# Patient Record
Sex: FEMALE | URBAN - METROPOLITAN AREA
[De-identification: names, ages, dates, MRNs, and addresses within clinical notes are randomized per-mention and may not be internally consistent; named-entity substitution may affect disease eponyms.]

---

## 2022-01-18 ENCOUNTER — APPOINTMENT (RX ONLY)
Dept: URBAN - METROPOLITAN AREA CLINIC 11 | Facility: CLINIC | Age: 52
Setting detail: DERMATOLOGY
End: 2022-01-18

## 2022-01-18 DIAGNOSIS — Z41.1 ENCOUNTER FOR COSMETIC SURGERY: ICD-10-CM

## 2022-01-18 PROCEDURE — ? CONSULTATION - ABDOMINOPLASTY

## 2022-01-18 PROCEDURE — 99003: CPT

## 2022-01-18 PROCEDURE — ? CONSULTATION - BREAST (COSMETIC)

## 2022-01-18 PROCEDURE — ? CONSULTATION - BODY CONTOURING

## 2022-01-18 ASSESSMENT — LOCATION SIMPLE DESCRIPTION DERM
LOCATION SIMPLE: ABDOMEN
LOCATION SIMPLE: RIGHT LOWER BACK
LOCATION SIMPLE: LEFT LOWER BACK
LOCATION SIMPLE: RIGHT BUTTOCK
LOCATION SIMPLE: LEFT BUTTOCK

## 2022-01-18 ASSESSMENT — LOCATION DETAILED DESCRIPTION DERM
LOCATION DETAILED: RIGHT INFERIOR LATERAL LOWER BACK
LOCATION DETAILED: RIGHT BUTTOCK
LOCATION DETAILED: LEFT INFERIOR LATERAL LOWER BACK
LOCATION DETAILED: PERIUMBILICAL SKIN
LOCATION DETAILED: LEFT BUTTOCK

## 2022-01-18 ASSESSMENT — LOCATION ZONE DERM: LOCATION ZONE: TRUNK

## 2022-01-18 NOTE — PROCEDURE: CONSULTATION - BREAST (COSMETIC)
Sientra Gel Implant Size(S) - Left: 350
Send Procedure Quote As Charge: No
Sientra Gel Implant Size(S) - Right: 350
Detail Level: Simple

## 2022-01-18 NOTE — HPI: ABDOMEN (AESTHETIC DEFORMITY, ABDOMEN)
Is This A New Presentation, Or A Follow-Up?: Abdominal Aesthetic Deformity
How Much Weight (In Lbs.) Have You Lost?: 97
Over What Period Of Time?: 2 years

## 2022-03-17 ENCOUNTER — APPOINTMENT (RX ONLY)
Dept: URBAN - METROPOLITAN AREA CLINIC 11 | Facility: CLINIC | Age: 52
Setting detail: DERMATOLOGY
End: 2022-03-17

## 2022-03-17 DIAGNOSIS — Z41.9 ENCOUNTER FOR PROCEDURE FOR PURPOSES OTHER THAN REMEDYING HEALTH STATE, UNSPECIFIED: ICD-10-CM

## 2022-03-17 PROCEDURE — Z1101: HCPCS

## 2022-03-17 PROCEDURE — 99004: CPT

## 2022-03-17 PROCEDURE — ? PRESCRIPTION

## 2022-03-17 PROCEDURE — ? PRE-OP WORKLIST

## 2022-03-17 RX ORDER — GABAPENTIN 300 MG/1
CAPSULE ORAL TID
Qty: 16 | Refills: 0 | Status: ERX | COMMUNITY
Start: 2022-03-17

## 2022-03-17 RX ORDER — ONDANSETRON 4 MG/1
TABLET, ORALLY DISINTEGRATING ORAL PRN
Qty: 15 | Refills: 0 | Status: ERX | COMMUNITY
Start: 2022-03-17

## 2022-03-17 RX ORDER — DIAZEPAM 5 MG/1
TABLET ORAL
Qty: 15 | Refills: 0 | Status: ERX | COMMUNITY
Start: 2022-03-17

## 2022-03-17 RX ORDER — OXYCODONE HYDROCHLORIDE 5 MG/1
TABLET ORAL PRN
Qty: 20 | Refills: 0 | Status: ERX | COMMUNITY
Start: 2022-03-17

## 2022-03-17 RX ORDER — MELOXICAM 7.5 MG/1
TABLET ORAL BID
Qty: 15 | Refills: 0 | Status: ERX | COMMUNITY
Start: 2022-03-17

## 2022-03-17 RX ORDER — CEPHALEXIN 500 MG/1
TABLET ORAL QID
Qty: 28 | Refills: 0 | Status: ERX | COMMUNITY
Start: 2022-03-17

## 2022-03-17 RX ADMIN — ONDANSETRON: 4 TABLET, ORALLY DISINTEGRATING ORAL at 00:00

## 2022-03-17 RX ADMIN — MELOXICAM: 7.5 TABLET ORAL at 00:00

## 2022-03-17 RX ADMIN — CEPHALEXIN: 500 TABLET ORAL at 00:00

## 2022-03-17 RX ADMIN — GABAPENTIN: 300 CAPSULE ORAL at 00:00

## 2022-03-17 RX ADMIN — DIAZEPAM: 5 TABLET ORAL at 00:00

## 2022-03-17 RX ADMIN — OXYCODONE HYDROCHLORIDE: 5 TABLET ORAL at 00:00

## 2022-03-17 ASSESSMENT — LOCATION DETAILED DESCRIPTION DERM
LOCATION DETAILED: LEFT INFERIOR LATERAL LOWER BACK
LOCATION DETAILED: RIGHT MEDIAL BREAST 5-6:00 REGION
LOCATION DETAILED: RIGHT AXILLARY TAIL OF BREAST
LOCATION DETAILED: LEFT AXILLARY TAIL OF BREAST
LOCATION DETAILED: PERIUMBILICAL SKIN
LOCATION DETAILED: LEFT BUTTOCK
LOCATION DETAILED: LEFT MEDIAL BREAST 6-7:00 REGION
LOCATION DETAILED: RIGHT BUTTOCK
LOCATION DETAILED: RIGHT INFERIOR LATERAL LOWER BACK

## 2022-03-17 ASSESSMENT — LOCATION SIMPLE DESCRIPTION DERM
LOCATION SIMPLE: RIGHT LOWER BACK
LOCATION SIMPLE: LEFT BUTTOCK
LOCATION SIMPLE: LEFT BREAST
LOCATION SIMPLE: LEFT LOWER BACK
LOCATION SIMPLE: ABDOMEN
LOCATION SIMPLE: RIGHT BUTTOCK
LOCATION SIMPLE: RIGHT BREAST

## 2022-03-17 ASSESSMENT — LOCATION ZONE DERM: LOCATION ZONE: TRUNK

## 2022-03-17 NOTE — HPI: PREOPERATIVE APPOINTMENT (BRIEF)
Has The Patient Completed Informed Consent?: is scheduled to complete informed consent documentation during this visit
Has The Patient Fulfilled Financial Responsibilities For Surgical Scheduling?: is scheduled to complete payment to fulfill financial responsibilities during this visit
Have Arrangements And Instructions Been Made For Patient Transportation?: Transportation arrangements have been made
Date Of Procedure?: 03/31/2022
Facility: [facility]
Surgeon: Dr. Del Rosario

## 2022-03-17 NOTE — PROCEDURE: PRE-OP WORKLIST
Surgery Scheduled: circumferential abdominoplasty with liposuction, bilateral mastopexy augmentation, axillary liposuction, fat grafting to buttocks
Date Of Surgery: 03/31/2022
Photos Taken?: yes
Admission Status: outpatient
Surgeon: Dr. Del Rosario
Detail Level: Simple

## 2022-03-31 ENCOUNTER — APPOINTMENT (RX ONLY)
Dept: URBAN - METROPOLITAN AREA SURGERY CENTER 4 | Facility: SURGERY CENTER | Age: 52
Setting detail: DERMATOLOGY
End: 2022-03-31

## 2022-03-31 DIAGNOSIS — Z41.1 ENCOUNTER FOR COSMETIC SURGERY: ICD-10-CM

## 2022-03-31 PROCEDURE — 15847 EXC SKIN ABD ADD-ON: CPT

## 2022-03-31 PROCEDURE — 19316 MASTOPEXY: CPT | Mod: 50

## 2022-03-31 PROCEDURE — ? SET GLOBAL PERIOD

## 2022-03-31 PROCEDURE — 19325 BREAST AUGMENTATION W/IMPLT: CPT | Mod: 50

## 2022-03-31 ASSESSMENT — LOCATION ZONE DERM: LOCATION ZONE: TRUNK

## 2022-03-31 ASSESSMENT — LOCATION DETAILED DESCRIPTION DERM: LOCATION DETAILED: PERIUMBILICAL SKIN

## 2022-03-31 ASSESSMENT — LOCATION SIMPLE DESCRIPTION DERM: LOCATION SIMPLE: ABDOMEN

## 2022-03-31 NOTE — PROCEDURE: SET GLOBAL PERIOD
Other Surgery Performed: circumferential abdominoplasty and mastopexy with implant and fat grafting to buttocks
Detail Level: Detailed
Surgery Global Period: 0
Date Of Surgery (Mm/Dd/Yyyy): 03/31/2022

## 2022-04-01 ENCOUNTER — APPOINTMENT (RX ONLY)
Dept: URBAN - METROPOLITAN AREA CLINIC 11 | Facility: CLINIC | Age: 52
Setting detail: DERMATOLOGY
End: 2022-04-01

## 2022-04-01 DIAGNOSIS — Z48.89 ENCOUNTER FOR OTHER SPECIFIED SURGICAL AFTERCARE: ICD-10-CM

## 2022-04-01 PROCEDURE — ? COUNSELING - POST-OP CHECK, ABDOMINOPLASTY

## 2022-04-01 PROCEDURE — ? COUNSELING - POST-OP CHECK, AUGMENTATION MASTOPEXY

## 2022-04-01 PROCEDURE — ? COUNSELING - POST-OP CHECK, LIPOSUCTION

## 2022-04-01 PROCEDURE — ? CODE 99024 - NO CHARGE POST-OP VISIT

## 2022-04-01 PROCEDURE — ? COUNSELING - POST-OP CHECK

## 2022-04-01 PROCEDURE — 99024 POSTOP FOLLOW-UP VISIT: CPT

## 2022-04-01 ASSESSMENT — LOCATION DETAILED DESCRIPTION DERM
LOCATION DETAILED: RIGHT LATERAL ABDOMEN
LOCATION DETAILED: LEFT ANTERIOR AXILLA
LOCATION DETAILED: LEFT MEDIAL BREAST 8-9:00 REGION
LOCATION DETAILED: RIGHT BUTTOCK
LOCATION DETAILED: LEFT LATERAL ABDOMEN
LOCATION DETAILED: RIGHT ANTERIOR AXILLA
LOCATION DETAILED: LEFT BUTTOCK
LOCATION DETAILED: PERIUMBILICAL SKIN
LOCATION DETAILED: RIGHT MEDIAL BREAST 5-6:00 REGION

## 2022-04-01 ASSESSMENT — LOCATION SIMPLE DESCRIPTION DERM
LOCATION SIMPLE: LEFT BUTTOCK
LOCATION SIMPLE: LEFT ANTERIOR AXILLA
LOCATION SIMPLE: LEFT BREAST
LOCATION SIMPLE: RIGHT ANTERIOR AXILLA
LOCATION SIMPLE: RIGHT BUTTOCK
LOCATION SIMPLE: RIGHT BREAST
LOCATION SIMPLE: ABDOMEN

## 2022-04-01 ASSESSMENT — LOCATION ZONE DERM
LOCATION ZONE: TRUNK
LOCATION ZONE: AXILLAE
LOCATION ZONE: TRUNK

## 2022-04-01 NOTE — PROCEDURE: COUNSELING - POST-OP CHECK, ABDOMINOPLASTY
Detail Level: Simple
Add Postop Global No-Charge Code (66880)?: yes
Count Minor/Major Decisions Toward Mdm (Not Cosmetic)?: No

## 2022-04-01 NOTE — PROCEDURE: COUNSELING - POST-OP CHECK, AUGMENTATION MASTOPEXY
Add Postop Global No-Charge Code ([id number])?: yes
Detail Level: Zone
Count Minor/Major Decisions Toward Mdm (Not Cosmetic)?: No

## 2022-04-01 NOTE — PROCEDURE: COUNSELING - POST-OP CHECK, LIPOSUCTION
Detail Level: Simple
Add Postop Global No-Charge Code ([id number])?: yes
Count Minor/Major Decisions Toward Mdm (Not Cosmetic)?: No

## 2022-04-01 NOTE — PROCEDURE: REASSURANCE
Follow-Up Interval: week
Information Provided: The examination is within normal limits today.
Follow-Up Increment: 1
Detail Level: Simple
Follow-Up: yes

## 2022-04-07 ENCOUNTER — APPOINTMENT (RX ONLY)
Dept: URBAN - METROPOLITAN AREA CLINIC 11 | Facility: CLINIC | Age: 52
Setting detail: DERMATOLOGY
End: 2022-04-07

## 2022-04-07 DIAGNOSIS — Z48.89 ENCOUNTER FOR OTHER SPECIFIED SURGICAL AFTERCARE: ICD-10-CM

## 2022-04-07 PROCEDURE — 99024 POSTOP FOLLOW-UP VISIT: CPT

## 2022-04-07 PROCEDURE — ? COUNSELING - POST-OP CHECK, AUGMENTATION MASTOPEXY

## 2022-04-07 PROCEDURE — ? COUNSELING - POST-OP CHECK, LIPOSUCTION

## 2022-04-07 PROCEDURE — ? COUNSELING - POST-OP CHECK, ABDOMINOPLASTY

## 2022-04-07 PROCEDURE — ? PRESCRIPTION

## 2022-04-07 PROCEDURE — ? SUTURE REMOVAL

## 2022-04-07 PROCEDURE — ? COUNSELING - POST-OP CHECK

## 2022-04-07 RX ORDER — CEPHALEXIN 500 MG/1
TABLET ORAL QID
Qty: 28 | Refills: 0 | Status: ERX | COMMUNITY
Start: 2022-04-07

## 2022-04-07 RX ADMIN — CEPHALEXIN: 500 TABLET ORAL at 00:00

## 2022-04-07 ASSESSMENT — LOCATION DETAILED DESCRIPTION DERM
LOCATION DETAILED: RIGHT MEDIAL BREAST 5-6:00 REGION
LOCATION DETAILED: RIGHT ANTERIOR AXILLA
LOCATION DETAILED: RIGHT LATERAL ABDOMEN
LOCATION DETAILED: RIGHT BUTTOCK
LOCATION DETAILED: LEFT LATERAL ABDOMEN
LOCATION DETAILED: LEFT BUTTOCK
LOCATION DETAILED: PERIUMBILICAL SKIN
LOCATION DETAILED: RIGHT INFERIOR LATERAL LOWER BACK
LOCATION DETAILED: LEFT ANTERIOR AXILLA
LOCATION DETAILED: LEFT INFERIOR LATERAL LOWER BACK
LOCATION DETAILED: LEFT MEDIAL BREAST 8-9:00 REGION

## 2022-04-07 ASSESSMENT — LOCATION SIMPLE DESCRIPTION DERM
LOCATION SIMPLE: RIGHT BUTTOCK
LOCATION SIMPLE: RIGHT LOWER BACK
LOCATION SIMPLE: LEFT LOWER BACK
LOCATION SIMPLE: ABDOMEN
LOCATION SIMPLE: LEFT BUTTOCK
LOCATION SIMPLE: RIGHT ANTERIOR AXILLA
LOCATION SIMPLE: LEFT ANTERIOR AXILLA
LOCATION SIMPLE: RIGHT BREAST
LOCATION SIMPLE: LEFT BREAST

## 2022-04-07 ASSESSMENT — LOCATION ZONE DERM
LOCATION ZONE: TRUNK
LOCATION ZONE: TRUNK
LOCATION ZONE: AXILLAE

## 2022-04-07 NOTE — PROCEDURE: COUNSELING - POST-OP CHECK, AUGMENTATION MASTOPEXY
Add Postop Global No-Charge Code (41802)?: yes
Detail Level: Zone
Count Minor/Major Decisions Toward Mdm (Not Cosmetic)?: No

## 2022-04-07 NOTE — HPI: POST-OP CHECK, COSMETIC (EXTENDED)
What Best Describes The Level Of Symmetry? (Check All That Apply): good
How Severe Is Your Pain?: mild
How Is Your Wound Healing?: healing well
What Is Your Overall Satisfaction Level With The Current Outcome?: satisfied
Date Of Procedure: 03/31/2022

## 2022-04-07 NOTE — PROCEDURE: COUNSELING - POST-OP CHECK, LIPOSUCTION
Detail Level: Simple
Add Postop Global No-Charge Code ([id number])?: yes
Count Minor/Major Decisions Toward Mdm (Not Cosmetic)?: No

## 2022-04-07 NOTE — PROCEDURE: COUNSELING - POST-OP CHECK, ABDOMINOPLASTY
Detail Level: Simple
Add Postop Global No-Charge Code ([id number])?: yes
Count Minor/Major Decisions Toward Mdm (Not Cosmetic)?: No

## 2022-04-14 ENCOUNTER — APPOINTMENT (RX ONLY)
Dept: URBAN - METROPOLITAN AREA CLINIC 11 | Facility: CLINIC | Age: 52
Setting detail: DERMATOLOGY
End: 2022-04-14

## 2022-04-14 DIAGNOSIS — Z48.89 ENCOUNTER FOR OTHER SPECIFIED SURGICAL AFTERCARE: ICD-10-CM

## 2022-04-14 PROCEDURE — ? COUNSELING - POST-OP CHECK, AUGMENTATION MASTOPEXY

## 2022-04-14 PROCEDURE — 99024 POSTOP FOLLOW-UP VISIT: CPT

## 2022-04-14 PROCEDURE — ? PRESCRIPTION

## 2022-04-14 PROCEDURE — ? COUNSELING - POST-OP CHECK

## 2022-04-14 PROCEDURE — ? COUNSELING - POST-OP CHECK, LIPOSUCTION

## 2022-04-14 PROCEDURE — ? COUNSELING - POST-OP CHECK, ABDOMINOPLASTY

## 2022-04-14 PROCEDURE — ? STAPLE REMOVAL

## 2022-04-14 RX ORDER — DOXYCYCLINE HYCLATE 100 MG/1
TABLET, COATED ORAL QD
Qty: 7 | Refills: 0 | Status: ERX | COMMUNITY
Start: 2022-04-14

## 2022-04-14 RX ADMIN — DOXYCYCLINE HYCLATE: 100 TABLET, COATED ORAL at 00:00

## 2022-04-14 ASSESSMENT — LOCATION SIMPLE DESCRIPTION DERM
LOCATION SIMPLE: LEFT ANTERIOR AXILLA
LOCATION SIMPLE: RIGHT BREAST
LOCATION SIMPLE: RIGHT ANTERIOR AXILLA
LOCATION SIMPLE: RIGHT BREAST
LOCATION SIMPLE: LEFT LOWER BACK
LOCATION SIMPLE: LEFT BREAST
LOCATION SIMPLE: ABDOMEN
LOCATION SIMPLE: RIGHT BUTTOCK
LOCATION SIMPLE: LEFT BUTTOCK
LOCATION SIMPLE: LEFT BREAST

## 2022-04-14 ASSESSMENT — LOCATION DETAILED DESCRIPTION DERM
LOCATION DETAILED: LEFT BUTTOCK
LOCATION DETAILED: RIGHT MEDIAL BREAST 5-6:00 REGION
LOCATION DETAILED: LEFT MEDIAL BREAST 8-9:00 REGION
LOCATION DETAILED: RIGHT BUTTOCK
LOCATION DETAILED: LEFT LATERAL ABDOMEN
LOCATION DETAILED: RIGHT ANTERIOR AXILLA
LOCATION DETAILED: LEFT SUPERIOR LATERAL LOWER BACK
LOCATION DETAILED: PERIUMBILICAL SKIN
LOCATION DETAILED: LEFT MEDIAL BREAST 8-9:00 REGION
LOCATION DETAILED: LEFT ANTERIOR AXILLA
LOCATION DETAILED: RIGHT MEDIAL BREAST 5-6:00 REGION
LOCATION DETAILED: RIGHT LATERAL ABDOMEN

## 2022-04-14 ASSESSMENT — LOCATION ZONE DERM
LOCATION ZONE: TRUNK
LOCATION ZONE: AXILLAE
LOCATION ZONE: TRUNK

## 2022-04-14 NOTE — PROCEDURE: COUNSELING - POST-OP CHECK, AUGMENTATION MASTOPEXY
Add Postop Global No-Charge Code (09732)?: yes
Detail Level: Simple
Count Minor/Major Decisions Toward Mdm (Not Cosmetic)?: No

## 2022-04-14 NOTE — PROCEDURE: COUNSELING - POST-OP CHECK, ABDOMINOPLASTY
Detail Level: Simple
Add Postop Global No-Charge Code ([id number])?: yes
Count Minor/Major Decisions Toward Mdm (Not Cosmetic)?: No

## 2022-04-14 NOTE — PROCEDURE: COUNSELING - POST-OP CHECK, LIPOSUCTION
Detail Level: Simple
Add Postop Global No-Charge Code (54866)?: yes
Count Minor/Major Decisions Toward Mdm (Not Cosmetic)?: No

## 2022-04-14 NOTE — HPI: POST-OP CHECK, COSMETIC (EXTENDED)
What Best Describes The Level Of Symmetry? (Check All That Apply): good
How Severe Is Your Pain?: mild
How Is Your Wound Healing?: healing well
Compared To The Last Evaluation, How Have The Findings Changed?: improved

## 2022-04-15 ENCOUNTER — APPOINTMENT (RX ONLY)
Dept: URBAN - METROPOLITAN AREA CLINIC 11 | Facility: CLINIC | Age: 52
Setting detail: DERMATOLOGY
End: 2022-04-15

## 2022-04-15 DIAGNOSIS — Z48.89 ENCOUNTER FOR OTHER SPECIFIED SURGICAL AFTERCARE: ICD-10-CM

## 2022-04-15 PROCEDURE — ? COUNSELING - POST-OP CHECK, ABDOMINOPLASTY

## 2022-04-15 PROCEDURE — ? DRAIN REMOVAL

## 2022-04-15 PROCEDURE — 99024 POSTOP FOLLOW-UP VISIT: CPT

## 2022-04-15 ASSESSMENT — LOCATION ZONE DERM: LOCATION ZONE: TRUNK

## 2022-04-15 ASSESSMENT — LOCATION SIMPLE DESCRIPTION DERM
LOCATION SIMPLE: GROIN
LOCATION SIMPLE: ABDOMEN

## 2022-04-15 ASSESSMENT — LOCATION DETAILED DESCRIPTION DERM
LOCATION DETAILED: RIGHT SUPRAPUBIC SKIN
LOCATION DETAILED: PERIUMBILICAL SKIN

## 2022-04-15 NOTE — PROCEDURE: COUNSELING - POST-OP CHECK, ABDOMINOPLASTY
Detail Level: Simple
Add Postop Global No-Charge Code (24678)?: yes
Count Minor/Major Decisions Toward Mdm (Not Cosmetic)?: No

## 2022-04-20 ENCOUNTER — APPOINTMENT (RX ONLY)
Dept: URBAN - METROPOLITAN AREA CLINIC 11 | Facility: CLINIC | Age: 52
Setting detail: DERMATOLOGY
End: 2022-04-20

## 2022-04-20 DIAGNOSIS — Z48.89 ENCOUNTER FOR OTHER SPECIFIED SURGICAL AFTERCARE: ICD-10-CM

## 2022-04-20 PROCEDURE — ? COUNSELING - POST-OP CHECK, AUGMENTATION MASTOPEXY

## 2022-04-20 PROCEDURE — 99024 POSTOP FOLLOW-UP VISIT: CPT

## 2022-04-20 PROCEDURE — ? COUNSELING - POST-OP CHECK, ABDOMINOPLASTY

## 2022-04-20 PROCEDURE — ? STAPLE REMOVAL

## 2022-04-20 PROCEDURE — ? PRESCRIPTION

## 2022-04-20 RX ORDER — DOXYCYCLINE HYCLATE 100 MG/1
TABLET, COATED ORAL QD
Qty: 7 | Refills: 0 | Status: ERX

## 2022-04-20 ASSESSMENT — LOCATION SIMPLE DESCRIPTION DERM
LOCATION SIMPLE: ABDOMEN
LOCATION SIMPLE: LOWER BACK
LOCATION SIMPLE: LEFT BUTTOCK
LOCATION SIMPLE: RIGHT BREAST
LOCATION SIMPLE: RIGHT BUTTOCK
LOCATION SIMPLE: LEFT BREAST

## 2022-04-20 ASSESSMENT — LOCATION DETAILED DESCRIPTION DERM
LOCATION DETAILED: LEFT MEDIAL BREAST 8-9:00 REGION
LOCATION DETAILED: INFERIOR LUMBAR SPINE
LOCATION DETAILED: RIGHT MEDIAL BREAST 5-6:00 REGION
LOCATION DETAILED: RIGHT BUTTOCK
LOCATION DETAILED: PERIUMBILICAL SKIN
LOCATION DETAILED: LEFT BUTTOCK

## 2022-04-20 ASSESSMENT — LOCATION ZONE DERM
LOCATION ZONE: TRUNK
LOCATION ZONE: TRUNK

## 2022-04-20 NOTE — HPI: POST-OP CHECK, COSMETIC (EXTENDED)
How Severe Is Your Pain?: mild
How Is Your Wound Healing?: healing well
Compared To The Last Evaluation, How Have The Findings Changed?: stable
Date Of Procedure: 03/31/2022

## 2022-04-20 NOTE — PROCEDURE: COUNSELING - POST-OP CHECK, ABDOMINOPLASTY
Detail Level: Simple
Add Postop Global No-Charge Code (30418)?: yes
Count Minor/Major Decisions Toward Mdm (Not Cosmetic)?: No

## 2022-04-27 ENCOUNTER — APPOINTMENT (RX ONLY)
Dept: URBAN - METROPOLITAN AREA CLINIC 11 | Facility: CLINIC | Age: 52
Setting detail: DERMATOLOGY
End: 2022-04-27

## 2022-04-27 DIAGNOSIS — Z48.89 ENCOUNTER FOR OTHER SPECIFIED SURGICAL AFTERCARE: ICD-10-CM

## 2022-04-27 PROCEDURE — ? PRESCRIPTION

## 2022-04-27 PROCEDURE — 99024 POSTOP FOLLOW-UP VISIT: CPT

## 2022-04-27 PROCEDURE — ? COUNSELING - POST-OP CHECK, AUGMENTATION MASTOPEXY

## 2022-04-27 PROCEDURE — ? COUNSELING - POST-OP CHECK, ABDOMINOPLASTY

## 2022-04-27 RX ORDER — FLUCONAZOLE 150 MG/1
TABLET ORAL
Qty: 2 | Refills: 0 | Status: ERX | COMMUNITY
Start: 2022-04-27

## 2022-04-27 RX ORDER — DOXYCYCLINE HYCLATE 100 MG/1
TABLET, COATED ORAL QD
Qty: 7 | Refills: 0 | Status: ERX | COMMUNITY
Start: 2022-04-27

## 2022-04-27 RX ADMIN — DOXYCYCLINE HYCLATE: 100 TABLET, COATED ORAL at 00:00

## 2022-04-27 RX ADMIN — FLUCONAZOLE: 150 TABLET ORAL at 00:00

## 2022-04-27 ASSESSMENT — LOCATION SIMPLE DESCRIPTION DERM
LOCATION SIMPLE: ABDOMEN
LOCATION SIMPLE: LEFT BREAST
LOCATION SIMPLE: LEFT BREAST
LOCATION SIMPLE: RIGHT BREAST
LOCATION SIMPLE: RIGHT BREAST

## 2022-04-27 ASSESSMENT — LOCATION DETAILED DESCRIPTION DERM
LOCATION DETAILED: LEFT MEDIAL BREAST 7-8:00 REGION
LOCATION DETAILED: RIGHT MEDIAL BREAST 5-6:00 REGION
LOCATION DETAILED: RIGHT MEDIAL BREAST 5-6:00 REGION
LOCATION DETAILED: LEFT MEDIAL BREAST 8-9:00 REGION
LOCATION DETAILED: PERIUMBILICAL SKIN

## 2022-04-27 ASSESSMENT — LOCATION ZONE DERM
LOCATION ZONE: TRUNK
LOCATION ZONE: TRUNK

## 2022-04-27 NOTE — PROCEDURE: COUNSELING - POST-OP CHECK, ABDOMINOPLASTY
Detail Level: Simple
Add Postop Global No-Charge Code (93855)?: yes
Count Minor/Major Decisions Toward Mdm (Not Cosmetic)?: No

## 2022-04-27 NOTE — PROCEDURE: COUNSELING - POST-OP CHECK, AUGMENTATION MASTOPEXY
Add Postop Global No-Charge Code (38113)?: yes
Detail Level: Zone
Count Minor/Major Decisions Toward Mdm (Not Cosmetic)?: No

## 2022-05-02 ENCOUNTER — APPOINTMENT (RX ONLY)
Dept: URBAN - METROPOLITAN AREA CLINIC 11 | Facility: CLINIC | Age: 52
Setting detail: DERMATOLOGY
End: 2022-05-02

## 2022-05-02 DIAGNOSIS — Z48.89 ENCOUNTER FOR OTHER SPECIFIED SURGICAL AFTERCARE: ICD-10-CM

## 2022-05-02 PROCEDURE — 99024 POSTOP FOLLOW-UP VISIT: CPT

## 2022-05-02 PROCEDURE — ? COUNSELING - POST-OP CHECK, ABDOMINOPLASTY

## 2022-05-02 PROCEDURE — ? COUNSELING - POST-OP CHECK, AUGMENTATION MASTOPEXY

## 2022-05-02 PROCEDURE — ? DRAIN REMOVAL

## 2022-05-02 ASSESSMENT — LOCATION SIMPLE DESCRIPTION DERM
LOCATION SIMPLE: GROIN
LOCATION SIMPLE: ABDOMEN
LOCATION SIMPLE: LEFT BREAST
LOCATION SIMPLE: RIGHT BREAST
LOCATION SIMPLE: RIGHT BREAST
LOCATION SIMPLE: LEFT BREAST

## 2022-05-02 ASSESSMENT — LOCATION DETAILED DESCRIPTION DERM
LOCATION DETAILED: PERIUMBILICAL SKIN
LOCATION DETAILED: LEFT MEDIAL BREAST 8-9:00 REGION
LOCATION DETAILED: RIGHT MEDIAL BREAST 5-6:00 REGION
LOCATION DETAILED: LEFT SUPRAPUBIC SKIN
LOCATION DETAILED: LEFT MEDIAL BREAST 7-8:00 REGION
LOCATION DETAILED: RIGHT MEDIAL BREAST 5-6:00 REGION

## 2022-05-02 ASSESSMENT — LOCATION ZONE DERM
LOCATION ZONE: TRUNK
LOCATION ZONE: TRUNK

## 2022-05-02 NOTE — PROCEDURE: COUNSELING - POST-OP CHECK, AUGMENTATION MASTOPEXY
Add Postop Global No-Charge Code ([id number])?: yes
Detail Level: Simple
Count Minor/Major Decisions Toward Mdm (Not Cosmetic)?: No

## 2022-05-02 NOTE — HPI: POST-OP CHECK, COSMETIC (EXTENDED)
What Best Describes The Level Of Symmetry? (Check All That Apply): good
How Severe Is Your Pain?: mild
How Is Your Wound Healing?: healing well
Date Of Procedure: 3/31/2022
Additional History: Patient reports drain has been draining 20 ccs in a 15 hr period

## 2022-05-02 NOTE — PROCEDURE: COUNSELING - POST-OP CHECK, ABDOMINOPLASTY
Detail Level: Simple
Add Postop Global No-Charge Code (36737)?: yes
Count Minor/Major Decisions Toward Mdm (Not Cosmetic)?: No

## 2022-05-17 ENCOUNTER — APPOINTMENT (RX ONLY)
Dept: URBAN - METROPOLITAN AREA CLINIC 11 | Facility: CLINIC | Age: 52
Setting detail: DERMATOLOGY
End: 2022-05-17

## 2022-05-17 DIAGNOSIS — Z48.89 ENCOUNTER FOR OTHER SPECIFIED SURGICAL AFTERCARE: ICD-10-CM

## 2022-05-17 PROCEDURE — 99024 POSTOP FOLLOW-UP VISIT: CPT

## 2022-05-17 PROCEDURE — ? COUNSELING - POST-OP CHECK, FAT TRANSFER

## 2022-05-17 PROCEDURE — ? COUNSELING - POST-OP CHECK, AUGMENTATION MASTOPEXY

## 2022-05-17 PROCEDURE — ? COUNSELING - POST-OP CHECK, LIPOSUCTION

## 2022-05-17 PROCEDURE — ? COUNSELING - POST-OP CHECK, ABDOMINOPLASTY

## 2022-05-17 ASSESSMENT — LOCATION DETAILED DESCRIPTION DERM
LOCATION DETAILED: RIGHT ANTERIOR AXILLA
LOCATION DETAILED: RIGHT BUTTOCK
LOCATION DETAILED: RIGHT LATERAL ABDOMEN
LOCATION DETAILED: LEFT MEDIAL BREAST 7-8:00 REGION
LOCATION DETAILED: LEFT LATERAL ABDOMEN
LOCATION DETAILED: RIGHT MEDIAL BREAST 5-6:00 REGION
LOCATION DETAILED: PERIUMBILICAL SKIN
LOCATION DETAILED: LEFT MEDIAL BREAST 8-9:00 REGION
LOCATION DETAILED: LEFT BUTTOCK
LOCATION DETAILED: RIGHT MEDIAL BREAST 5-6:00 REGION
LOCATION DETAILED: LEFT ANTERIOR AXILLA

## 2022-05-17 ASSESSMENT — LOCATION SIMPLE DESCRIPTION DERM
LOCATION SIMPLE: RIGHT ANTERIOR AXILLA
LOCATION SIMPLE: RIGHT BREAST
LOCATION SIMPLE: LEFT BUTTOCK
LOCATION SIMPLE: RIGHT BREAST
LOCATION SIMPLE: ABDOMEN
LOCATION SIMPLE: LEFT BREAST
LOCATION SIMPLE: LEFT BREAST
LOCATION SIMPLE: RIGHT BUTTOCK
LOCATION SIMPLE: LEFT ANTERIOR AXILLA

## 2022-05-17 ASSESSMENT — LOCATION ZONE DERM
LOCATION ZONE: TRUNK
LOCATION ZONE: TRUNK
LOCATION ZONE: AXILLAE

## 2022-05-17 NOTE — PROCEDURE: COUNSELING - POST-OP CHECK, ABDOMINOPLASTY
Detail Level: Simple
Add Postop Global No-Charge Code (15309)?: yes
Count Minor/Major Decisions Toward Mdm (Not Cosmetic)?: No

## 2022-05-17 NOTE — PROCEDURE: COUNSELING - POST-OP CHECK, LIPOSUCTION
Detail Level: Simple
Add Postop Global No-Charge Code (96014)?: yes
Count Minor/Major Decisions Toward Mdm (Not Cosmetic)?: No

## 2022-05-17 NOTE — PROCEDURE: COUNSELING - POST-OP CHECK, AUGMENTATION MASTOPEXY
Add Postop Global No-Charge Code (21565)?: yes
Detail Level: Simple
Count Minor/Major Decisions Toward Mdm (Not Cosmetic)?: No

## 2022-05-17 NOTE — HPI: POST-OP CHECK, COSMETIC (EXTENDED)
What Best Describes The Level Of Symmetry? (Check All That Apply): good
How Severe Is Your Pain?: mild
How Is Your Wound Healing?: healing well
Compared To The Last Evaluation, How Have The Findings Changed?: improved
Date Of Procedure: 3/31/2022

## 2022-05-17 NOTE — PROCEDURE: COUNSELING - POST-OP CHECK, FAT TRANSFER
Add Postop Global No-Charge Code (52872)?: yes
Detail Level: Simple
Count Minor/Major Decisions Toward Mdm (Not Cosmetic)?: No

## 2022-10-04 ENCOUNTER — APPOINTMENT (RX ONLY)
Dept: URBAN - METROPOLITAN AREA CLINIC 11 | Facility: CLINIC | Age: 52
Setting detail: DERMATOLOGY
End: 2022-10-04

## 2022-10-04 DIAGNOSIS — Z48.89 ENCOUNTER FOR OTHER SPECIFIED SURGICAL AFTERCARE: ICD-10-CM

## 2022-10-04 PROCEDURE — ? COUNSELING - POST-OP CHECK, AUGMENTATION MASTOPEXY

## 2022-10-04 PROCEDURE — ? COUNSELING - POST-OP CHECK, FAT TRANSFER

## 2022-10-04 PROCEDURE — ? COUNSELING - POST-OP CHECK, LIPOSUCTION

## 2022-10-04 PROCEDURE — ? COUNSELING - POST-OP CHECK, ABDOMINOPLASTY

## 2022-10-04 PROCEDURE — 99024 POSTOP FOLLOW-UP VISIT: CPT

## 2022-10-04 ASSESSMENT — LOCATION SIMPLE DESCRIPTION DERM
LOCATION SIMPLE: ABDOMEN
LOCATION SIMPLE: LEFT ANTERIOR AXILLA
LOCATION SIMPLE: RIGHT BREAST
LOCATION SIMPLE: LEFT BUTTOCK
LOCATION SIMPLE: RIGHT BREAST
LOCATION SIMPLE: LEFT BREAST
LOCATION SIMPLE: RIGHT BUTTOCK
LOCATION SIMPLE: LEFT BREAST
LOCATION SIMPLE: RIGHT ANTERIOR AXILLA

## 2022-10-04 ASSESSMENT — LOCATION DETAILED DESCRIPTION DERM
LOCATION DETAILED: LEFT BUTTOCK
LOCATION DETAILED: LEFT ANTERIOR AXILLA
LOCATION DETAILED: LEFT LATERAL ABDOMEN
LOCATION DETAILED: RIGHT MEDIAL BREAST 5-6:00 REGION
LOCATION DETAILED: PERIUMBILICAL SKIN
LOCATION DETAILED: RIGHT BUTTOCK
LOCATION DETAILED: RIGHT LATERAL ABDOMEN
LOCATION DETAILED: LEFT MEDIAL BREAST 8-9:00 REGION
LOCATION DETAILED: RIGHT MEDIAL BREAST 5-6:00 REGION
LOCATION DETAILED: LEFT MEDIAL BREAST 7-8:00 REGION
LOCATION DETAILED: RIGHT ANTERIOR AXILLA

## 2022-10-04 ASSESSMENT — LOCATION ZONE DERM
LOCATION ZONE: TRUNK
LOCATION ZONE: AXILLAE
LOCATION ZONE: TRUNK

## 2022-10-04 NOTE — HPI: POST-OP CHECK, COSMETIC (EXTENDED)
How Severe Is Your Pain?: 0 out of 10
How Is Your Wound Healing?: healing well
Compared To The Last Evaluation, How Have The Findings Changed?: stable
Date Of Procedure: 03/31/2022

## 2022-10-04 NOTE — PROCEDURE: COUNSELING - POST-OP CHECK, AUGMENTATION MASTOPEXY
Add Postop Global No-Charge Code (01569)?: yes
Detail Level: Simple
Count Minor/Major Decisions Toward Mdm (Not Cosmetic)?: No

## 2022-10-04 NOTE — PROCEDURE: COUNSELING - POST-OP CHECK, FAT TRANSFER
Add Postop Global No-Charge Code (90040)?: yes
Detail Level: Simple
Count Minor/Major Decisions Toward Mdm (Not Cosmetic)?: No

## 2022-10-04 NOTE — PROCEDURE: COUNSELING - POST-OP CHECK, ABDOMINOPLASTY
Detail Level: Simple
Add Postop Global No-Charge Code (37383)?: yes
Count Minor/Major Decisions Toward Mdm (Not Cosmetic)?: No

## 2022-10-04 NOTE — PROCEDURE: COUNSELING - POST-OP CHECK, LIPOSUCTION
Detail Level: Simple
Add Postop Global No-Charge Code (31750)?: yes
Count Minor/Major Decisions Toward Mdm (Not Cosmetic)?: No

## 2023-03-28 ENCOUNTER — APPOINTMENT (RX ONLY)
Dept: URBAN - METROPOLITAN AREA CLINIC 11 | Facility: CLINIC | Age: 53
Setting detail: DERMATOLOGY
End: 2023-03-28

## 2023-03-28 DIAGNOSIS — Z48.89 ENCOUNTER FOR OTHER SPECIFIED SURGICAL AFTERCARE: ICD-10-CM

## 2023-03-28 PROCEDURE — ? COUNSELING - POST-OP CHECK, AUGMENTATION MASTOPEXY

## 2023-03-28 PROCEDURE — 99024 POSTOP FOLLOW-UP VISIT: CPT

## 2023-03-28 PROCEDURE — ? COUNSELING - POST-OP CHECK, LIPOSUCTION

## 2023-03-28 PROCEDURE — ? PHOTOS OBTAINED

## 2023-03-28 PROCEDURE — ? COUNSELING - POST-OP CHECK, FAT TRANSFER

## 2023-03-28 PROCEDURE — ? COUNSELING - POST-OP CHECK, ABDOMINOPLASTY

## 2023-03-28 ASSESSMENT — LOCATION DETAILED DESCRIPTION DERM
LOCATION DETAILED: RIGHT BUTTOCK
LOCATION DETAILED: LEFT ANTERIOR AXILLA
LOCATION DETAILED: LEFT MEDIAL BREAST 7-8:00 REGION
LOCATION DETAILED: RIGHT LATERAL BREAST 6-7:00 REGION
LOCATION DETAILED: PERIUMBILICAL SKIN
LOCATION DETAILED: RIGHT MEDIAL BREAST 5-6:00 REGION
LOCATION DETAILED: LEFT BUTTOCK
LOCATION DETAILED: LEFT MEDIAL BREAST 8-9:00 REGION
LOCATION DETAILED: RIGHT ANTERIOR AXILLA
LOCATION DETAILED: LEFT MEDIAL BREAST 6-7:00 REGION
LOCATION DETAILED: LEFT LATERAL ABDOMEN
LOCATION DETAILED: RIGHT MEDIAL BREAST 5-6:00 REGION
LOCATION DETAILED: RIGHT LATERAL ABDOMEN

## 2023-03-28 ASSESSMENT — LOCATION SIMPLE DESCRIPTION DERM
LOCATION SIMPLE: RIGHT ANTERIOR AXILLA
LOCATION SIMPLE: ABDOMEN
LOCATION SIMPLE: LEFT BUTTOCK
LOCATION SIMPLE: RIGHT BREAST
LOCATION SIMPLE: LEFT BREAST
LOCATION SIMPLE: LEFT BREAST
LOCATION SIMPLE: RIGHT BUTTOCK
LOCATION SIMPLE: LEFT ANTERIOR AXILLA
LOCATION SIMPLE: RIGHT BREAST

## 2023-03-28 ASSESSMENT — LOCATION ZONE DERM
LOCATION ZONE: TRUNK
LOCATION ZONE: TRUNK
LOCATION ZONE: AXILLAE

## 2023-03-28 NOTE — PROCEDURE: PHOTOS OBTAINED
Follow-Up Interval: year
Follow-Up Increment: 1
Detail Level: Simple
Follow-Up For Additional Photos?: no
Photographed Locations: Photos were obtained.

## 2023-03-28 NOTE — PROCEDURE: COUNSELING - POST-OP CHECK, AUGMENTATION MASTOPEXY
Add Postop Global No-Charge Code (13916)?: yes
Detail Level: Simple
Count Minor/Major Decisions Toward Mdm (Not Cosmetic)?: No

## 2023-03-28 NOTE — PROCEDURE: COUNSELING - POST-OP CHECK, ABDOMINOPLASTY
Detail Level: Simple
Add Postop Global No-Charge Code (87108)?: yes
Count Minor/Major Decisions Toward Mdm (Not Cosmetic)?: No

## 2023-03-28 NOTE — PROCEDURE: COUNSELING - POST-OP CHECK, FAT TRANSFER
Add Postop Global No-Charge Code (44638)?: yes
Detail Level: Simple
Count Minor/Major Decisions Toward Mdm (Not Cosmetic)?: No

## 2023-03-28 NOTE — HPI: POST-OP CHECK, COSMETIC (EXTENDED)
What Best Describes The Level Of Symmetry? (Check All That Apply): excellent
How Severe Is Your Pain?: 0 out of 10
How Is Your Wound Healing?: healed
Compared To The Last Evaluation, How Have The Findings Changed?: improved
What Is Your Overall Satisfaction Level With The Current Outcome?: satisfied
Date Of Procedure: 03/31/2022

## 2023-03-28 NOTE — PROCEDURE: COUNSELING - POST-OP CHECK, LIPOSUCTION
Detail Level: Simple
Add Postop Global No-Charge Code (75304)?: yes
Count Minor/Major Decisions Toward Mdm (Not Cosmetic)?: No